# Patient Record
Sex: MALE | Race: WHITE
[De-identification: names, ages, dates, MRNs, and addresses within clinical notes are randomized per-mention and may not be internally consistent; named-entity substitution may affect disease eponyms.]

---

## 2020-02-24 ENCOUNTER — HOSPITAL ENCOUNTER (EMERGENCY)
Dept: HOSPITAL 72 - EMR | Age: 35
LOS: 1 days | Discharge: HOME | End: 2020-02-25
Payer: COMMERCIAL

## 2020-02-24 VITALS — HEIGHT: 76 IN | WEIGHT: 200 LBS | BODY MASS INDEX: 24.36 KG/M2

## 2020-02-24 VITALS — DIASTOLIC BLOOD PRESSURE: 69 MMHG | SYSTOLIC BLOOD PRESSURE: 139 MMHG

## 2020-02-24 DIAGNOSIS — F10.239: Primary | ICD-10-CM
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
## 2020-02-24 DIAGNOSIS — G40.89: ICD-10-CM

## 2020-02-24 LAB
ADD MANUAL DIFF: NO
ALBUMIN SERPL-MCNC: 4.3 G/DL (ref 3.4–5)
ALBUMIN/GLOB SERPL: 1.1 {RATIO} (ref 1–2.7)
ALP SERPL-CCNC: 86 U/L (ref 46–116)
ALT SERPL-CCNC: 122 U/L (ref 12–78)
ANION GAP SERPL CALC-SCNC: 21 MMOL/L (ref 5–15)
AST SERPL-CCNC: 108 U/L (ref 15–37)
BASOPHILS NFR BLD AUTO: 1.2 % (ref 0–2)
BILIRUB SERPL-MCNC: 1 MG/DL (ref 0.2–1)
BUN SERPL-MCNC: 9 MG/DL (ref 7–18)
CALCIUM SERPL-MCNC: 9.7 MG/DL (ref 8.5–10.1)
CHLORIDE SERPL-SCNC: 99 MMOL/L (ref 98–107)
CO2 SERPL-SCNC: 24 MMOL/L (ref 21–32)
CREAT SERPL-MCNC: 1.1 MG/DL (ref 0.55–1.3)
EOSINOPHIL NFR BLD AUTO: 0.3 % (ref 0–3)
ERYTHROCYTE [DISTWIDTH] IN BLOOD BY AUTOMATED COUNT: 11.4 % (ref 11.6–14.8)
GLOBULIN SER-MCNC: 4 G/DL
HCT VFR BLD CALC: 49.3 % (ref 42–52)
HGB BLD-MCNC: 16.9 G/DL (ref 14.2–18)
LYMPHOCYTES NFR BLD AUTO: 29.8 % (ref 20–45)
MCV RBC AUTO: 105 FL (ref 80–99)
MONOCYTES NFR BLD AUTO: 12.6 % (ref 1–10)
NEUTROPHILS NFR BLD AUTO: 56.2 % (ref 45–75)
PLATELET # BLD: 210 K/UL (ref 150–450)
POTASSIUM SERPL-SCNC: 3.4 MMOL/L (ref 3.5–5.1)
RBC # BLD AUTO: 4.71 M/UL (ref 4.7–6.1)
SODIUM SERPL-SCNC: 143 MMOL/L (ref 136–145)
WBC # BLD AUTO: 12.6 K/UL (ref 4.8–10.8)

## 2020-02-24 PROCEDURE — 93005 ELECTROCARDIOGRAM TRACING: CPT

## 2020-02-24 PROCEDURE — 96375 TX/PRO/DX INJ NEW DRUG ADDON: CPT

## 2020-02-24 PROCEDURE — 85025 COMPLETE CBC W/AUTO DIFF WBC: CPT

## 2020-02-24 PROCEDURE — 82962 GLUCOSE BLOOD TEST: CPT

## 2020-02-24 PROCEDURE — 36415 COLL VENOUS BLD VENIPUNCTURE: CPT

## 2020-02-24 PROCEDURE — 96365 THER/PROPH/DIAG IV INF INIT: CPT

## 2020-02-24 PROCEDURE — 80307 DRUG TEST PRSMV CHEM ANLYZR: CPT

## 2020-02-24 PROCEDURE — 80053 COMPREHEN METABOLIC PANEL: CPT

## 2020-02-24 PROCEDURE — 74177 CT ABD & PELVIS W/CONTRAST: CPT

## 2020-02-24 PROCEDURE — 99284 EMERGENCY DEPT VISIT MOD MDM: CPT

## 2020-02-24 PROCEDURE — 83690 ASSAY OF LIPASE: CPT

## 2020-02-24 PROCEDURE — 81001 URINALYSIS AUTO W/SCOPE: CPT

## 2020-02-24 PROCEDURE — 96367 TX/PROPH/DG ADDL SEQ IV INF: CPT

## 2020-02-24 NOTE — EMERGENCY ROOM REPORT
History of Present Illness


General


Chief Complaint:  Seizure


Source:  Family Member, EMS





Present Illness


HPI


Is a 34-year-old male presents after witnessed seizure.  Patient had recently 

stopped drinking alcohol.  Previously reports drinking alcohol daily for 

prolonged period of time.  Patient states he also takes Xanax as well as Ativan 

intermittently.  Patient had had some episodes of increased muscle spasming as 

well as nausea and vomiting.  Had not been having any hematemesis or bloody 

stools recently.  Reports having increased epigastric pain.  Denies any recent 

trauma.


Allergies:  


Coded Allergies:  


     No Known Allergies (Unverified , 2/24/20)





Patient History


Past Medical History:  see triage record


Reviewed Nursing Documentation:  PMH: Agreed; PSxH: Agreed





Nursing Documentation-PM


Past Medical History:  No History, Except For





Review of Systems


All Other Systems:  negative except mentioned in HPI





Physical Exam





Vital Signs








  Date Time  Temp Pulse Resp B/P (MAP) Pulse Ox O2 Delivery O2 Flow Rate FiO2


 


2/24/20 22:44  104 18 139/69 (92) 98   








Sp02 EP Interpretation:  reviewed, normal


General Appearance:  normal inspection, well appearing, no apparent distress, 

alert, GCS 15


Head:  atraumatic


ENT:  normal ENT inspection, hearing grossly normal, normal voice


Neck:  normal inspection, full range of motion, supple, no bony tend


Respiratory:  normal inspection, lungs clear, normal breath sounds, no 

respiratory distress, no retraction, no wheezing


Cardiovascular #1:  regular rate, rhythm, no edema


Gastrointestinal:  normal inspection, normal bowel sounds, non tender, soft, no 

guarding, no hernia


Genitourinary:  no CVA tenderness


Musculoskeletal:  normal inspection, back normal, normal range of motion


Neurologic:  alert, motor strength/tone normal, CNs III-XII nml as tested, 

oriented x3, responsive, speech normal, normal inspection


Psychiatric:  normal inspection, judgement/insight normal, mood/affect normal





Medical Decision Making


Diagnostic Impression:  


 Primary Impression:  


 Alcohol abuse


 Additional Impression:  


 Alcohol withdrawal seizure


ER Course


 Patient presented for seizure.  Differential diagnosis included alcohol 

withdrawal, hyponatremia, cysticercosis, electrolyte abnormality, mass lesion, 

or cranial hemorrhage.  Imaging studies were ordered to patient's complaints of 

low back pain after the seizure.  CT imaging read by radiology showed no 

evidence of acute abdominal process with some fatty liver.  Patient was given 

medications for alcohol withdrawal including Ativan as well as GI cocktail.  He 

was noted to have improvement was able to ambulate without assistance.  At the 

time of discharge patient was awake alert and oriented.  He did not appear to 

be tremulous and was not tachycardic.  Appears to be stable for discharge.  The 

patient is advised to follow up with  primary care doctor in 1-2 days.  Patient 

is advised to return if any worsening condition or if any changes in status 

that are concerning.





This report is dictated with Dragon transcription software which may 

occasionally lead to discrepancies related to use of this software.





Labs








Test


  2/24/20


22:55 2/25/20


02:00


 


White Blood Count


  12.6 K/UL


(4.8-10.8) 


 


 


Red Blood Count


  4.71 M/UL


(4.70-6.10) 


 


 


Hemoglobin


  16.9 G/DL


(14.2-18.0) 


 


 


Hematocrit


  49.3 %


(42.0-52.0) 


 


 


Mean Corpuscular Volume 105 FL (80-99)  


 


Mean Corpuscular Hemoglobin


  35.8 PG


(27.0-31.0) 


 


 


Mean Corpuscular Hemoglobin


Concent 34.3 G/DL


(32.0-36.0) 


 


 


Red Cell Distribution Width


  11.4 %


(11.6-14.8) 


 


 


Platelet Count


  210 K/UL


(150-450) 


 


 


Mean Platelet Volume


  8.0 FL


(6.5-10.1) 


 


 


Neutrophils (%) (Auto)


  56.2 %


(45.0-75.0) 


 


 


Lymphocytes (%) (Auto)


  29.8 %


(20.0-45.0) 


 


 


Monocytes (%) (Auto)


  12.6 %


(1.0-10.0) 


 


 


Eosinophils (%) (Auto)


  0.3 %


(0.0-3.0) 


 


 


Basophils (%) (Auto)


  1.2 %


(0.0-2.0) 


 


 


Sodium Level


  143 MMOL/L


(136-145) 


 


 


Potassium Level


  3.4 MMOL/L


(3.5-5.1) 


 


 


Chloride Level


  99 MMOL/L


() 


 


 


Carbon Dioxide Level


  24 MMOL/L


(21-32) 


 


 


Anion Gap


  21 mmol/L


(5-15) 


 


 


Blood Urea Nitrogen 9 mg/dL (7-18)  


 


Creatinine


  1.1 MG/DL


(0.55-1.30) 


 


 


Estimat Glomerular Filtration


Rate > 60 mL/min


(>60) 


 


 


Glucose Level


  124 MG/DL


() 


 


 


Calcium Level


  9.7 MG/DL


(8.5-10.1) 


 


 


Total Bilirubin


  1.0 MG/DL


(0.2-1.0) 


 


 


Aspartate Amino Transf


(AST/SGOT) 108 U/L


(15-37) 


 


 


Alanine Aminotransferase


(ALT/SGPT) 122 U/L


(12-78) 


 


 


Alkaline Phosphatase


  86 U/L


() 


 


 


Total Protein


  8.3 G/DL


(6.4-8.2) 


 


 


Albumin


  4.3 G/DL


(3.4-5.0) 


 


 


Globulin 4.0 g/dL  


 


Albumin/Globulin Ratio 1.1 (1.0-2.7)  


 


Lipase


  84 U/L


() 


 


 


Urine Color  Pale yellow 


 


Urine Appearance  Clear 


 


Urine pH  8 (4.5-8.0) 


 


Urine Specific Gravity


  


  1.015


(1.005-1.035)


 


Urine Protein


  


  Negative


(NEGATIVE)


 


Urine Glucose (UA)


  


  Negative


(NEGATIVE)


 


Urine Ketones


  


  Negative


(NEGATIVE)


 


Urine Blood


  


  Negative


(NEGATIVE)


 


Urine Nitrite


  


  Negative


(NEGATIVE)


 


Urine Bilirubin


  


  Negative


(NEGATIVE)


 


Urine Urobilinogen


  


  Normal MG/DL


(0.0-1.0)


 


Urine Leukocyte Esterase


  


  Negative


(NEGATIVE)


 


Urine RBC


  


  0-2 /HPF (0 -


0)


 


Urine WBC  0 /HPF (0 - 0) 


 


Urine Squamous Epithelial


Cells 


  None /LPF


(NONE/OCC)


 


Urine Bacteria


  


  None /HPF


(NONE)


 


Urine Opiates Screen


  


  Negative


(NEGATIVE)


 


Urine Barbiturates Screen


  


  Negative


(NEGATIVE)


 


Phencyclidine (PCP) Screen


  


  Negative


(NEGATIVE)


 


Urine Amphetamines Screen


  


  Negative


(NEGATIVE)


 


Urine Benzodiazepines Screen


  


  Positive


(NEGATIVE)


 


Urine Cocaine Screen


  


  Negative


(NEGATIVE)


 


Urine Marijuana (THC) Screen


  


  Negative


(NEGATIVE)











Last Vital Signs








  Date Time  Temp Pulse Resp B/P (MAP) Pulse Ox O2 Delivery O2 Flow Rate FiO2


 


2/24/20 22:44  104 18 139/69 (92) 98   








Status:  improved


Disposition:  HOME, SELF-CARE


Condition:  Stable


Scripts


Ondansetron Odt* (ZOFRAN ODT*) 4 Mg Tab.rapdis


4 MG BC EVERY 8 HOURS PRN for Nausea & Vomiting, #10 TAB 0 Refills


   Prov: Felix Brooks MD         2/25/20 


Omeprazole (OMEPRAZOLE) 20 Mg Capsule.dr


20 MG ORAL DAILY, #30 CAP


   Prov: Felix Brooks MD         2/25/20 


Lorazepam* (ATIVAN*) 1 Mg Tablet


1 MG ORAL THREE TIMES A DAY, #14 TAB


   Prov: Felix Brooks MD         2/25/20


Referrals:  


NON PHYSICIAN (PCP)











Felix Brooks MD Feb 24, 2020 23:44

## 2020-02-24 NOTE — NUR
ED Nurse Note:

Pt brought in by ambulance from home s/p seizure at home. Per girlfriend, he 
was standing and fell to the ground, hitting head and shaking x1min. PT is 
confused, Pt is A&ox2. VSS, Pt placed on cardiac monitor

## 2020-02-25 VITALS — SYSTOLIC BLOOD PRESSURE: 128 MMHG | DIASTOLIC BLOOD PRESSURE: 67 MMHG

## 2020-02-25 VITALS — DIASTOLIC BLOOD PRESSURE: 65 MMHG | SYSTOLIC BLOOD PRESSURE: 132 MMHG

## 2020-02-25 VITALS — DIASTOLIC BLOOD PRESSURE: 67 MMHG | SYSTOLIC BLOOD PRESSURE: 128 MMHG

## 2020-02-25 LAB
APPEARANCE UR: CLEAR
APTT PPP: (no result) S
GLUCOSE UR STRIP-MCNC: NEGATIVE MG/DL
KETONES UR QL STRIP: NEGATIVE
LEUKOCYTE ESTERASE UR QL STRIP: NEGATIVE
NITRITE UR QL STRIP: NEGATIVE
PH UR STRIP: 8 [PH] (ref 4.5–8)
PROT UR QL STRIP: NEGATIVE
SP GR UR STRIP: 1.01 (ref 1–1.03)
UROBILINOGEN UR-MCNC: NORMAL MG/DL (ref 0–1)

## 2020-02-25 NOTE — NUR
ER DISCHARGE NOTE:

Patient is cleared to be discharged per ERMD, pt is aox4, on room air, with 
stable vital signs. pt was given dc and prescription instructions, pt was able 
to verbalize understanding, pt id band and iv site removed without 
complications. pt is able to ambulate with steady gait. pt took all belongings. 
pt given crutches to help ambulate, pt reported it is easier walking with it. 
spouse present

## 2020-02-25 NOTE — DIAGNOSTIC IMAGING REPORT
INDICATION: Abdominal pain

 

TECHNIQUE: Continuous helical transaxial imaging of the abdomen and pelvis was

obtained from the lung bases to the pubic symphysis during intravenous contrast

administration. Coronal 2-D reformats were also obtained. Study obtained in a Siemens

sensation 64 slice CT.  Automatic Exposure Control was utilized.

 

Total Dose length Product (DLP):  450.6 mGycm

 

CT Dose Index Volume (CTDIvol):   70.4 mGy

 

COMPARISON: None

 

FINDINGS: 

 

Lungs: There is posterior basal atelectasis demonstrated..

 

Liver: Liver is diffusely hypodense consistent with fatty infiltration

 

Gallbladder/biliary system: No gallstones are identified. There is no evidence of

intrahepatic or extrahepatic biliary ductal dilatation.

 

Spleen: Unremarkable

 

Pancreas: Unremarkable

 

Kidneys/Bladder: No hydronephrosis identified. Both kidneys enhance symmetrically.

The urinary bladder is unremarkable..

 

Adrenal glands: Unremarkable

 

Aorta/IVC: Unremarkable

 

Bowel: There is no evidence of bowel obstruction.

 

Peritoneum: There is no free fluid.

 

Bones: Unremarkable

 

IMPRESSION:

 

Fatty liver

 

Basal atelectasis.

 

[facility] has communicated the preliminary results to the Emergency

Department.  Their findings are largely concordant with this report.

 

 

 

 

 

 

 

 

 

 

The CT scanner at [facility] is accredited by the American College of

Radiology and the scans are performed using dose optimization techniques as

appropriate to a performed exam including Automatic Exposure control.

## 2020-04-20 ENCOUNTER — HOSPITAL ENCOUNTER (EMERGENCY)
Dept: HOSPITAL 72 - EMR | Age: 35
Discharge: HOME | End: 2020-04-20
Payer: COMMERCIAL

## 2020-04-20 VITALS — HEIGHT: 76 IN | WEIGHT: 185 LBS | BODY MASS INDEX: 22.53 KG/M2

## 2020-04-20 VITALS — SYSTOLIC BLOOD PRESSURE: 127 MMHG | DIASTOLIC BLOOD PRESSURE: 76 MMHG

## 2020-04-20 DIAGNOSIS — J98.11: ICD-10-CM

## 2020-04-20 DIAGNOSIS — R06.02: ICD-10-CM

## 2020-04-20 DIAGNOSIS — K76.0: ICD-10-CM

## 2020-04-20 DIAGNOSIS — Z79.899: ICD-10-CM

## 2020-04-20 DIAGNOSIS — K29.70: Primary | ICD-10-CM

## 2020-04-20 DIAGNOSIS — G40.909: ICD-10-CM

## 2020-04-20 LAB
ADD MANUAL DIFF: NO
ALBUMIN SERPL-MCNC: 4 G/DL (ref 3.4–5)
ALBUMIN/GLOB SERPL: 1 {RATIO} (ref 1–2.7)
ALP SERPL-CCNC: 71 U/L (ref 46–116)
ALT SERPL-CCNC: 36 U/L (ref 12–78)
ANION GAP SERPL CALC-SCNC: 5 MMOL/L (ref 5–15)
AST SERPL-CCNC: 24 U/L (ref 15–37)
BASOPHILS NFR BLD AUTO: 1 % (ref 0–2)
BILIRUB SERPL-MCNC: 0.3 MG/DL (ref 0.2–1)
BUN SERPL-MCNC: 10 MG/DL (ref 7–18)
CALCIUM SERPL-MCNC: 9.1 MG/DL (ref 8.5–10.1)
CHLORIDE SERPL-SCNC: 106 MMOL/L (ref 98–107)
CO2 SERPL-SCNC: 27 MMOL/L (ref 21–32)
CREAT SERPL-MCNC: 1 MG/DL (ref 0.55–1.3)
EOSINOPHIL NFR BLD AUTO: 1.5 % (ref 0–3)
ERYTHROCYTE [DISTWIDTH] IN BLOOD BY AUTOMATED COUNT: 11.8 % (ref 11.6–14.8)
GLOBULIN SER-MCNC: 3.9 G/DL
HCT VFR BLD CALC: 45.3 % (ref 42–52)
HGB BLD-MCNC: 15 G/DL (ref 14.2–18)
LYMPHOCYTES NFR BLD AUTO: 33.2 % (ref 20–45)
MCV RBC AUTO: 102 FL (ref 80–99)
MONOCYTES NFR BLD AUTO: 8.9 % (ref 1–10)
NEUTROPHILS NFR BLD AUTO: 55.4 % (ref 45–75)
PLATELET # BLD: 268 K/UL (ref 150–450)
POTASSIUM SERPL-SCNC: 3.6 MMOL/L (ref 3.5–5.1)
RBC # BLD AUTO: 4.45 M/UL (ref 4.7–6.1)
SODIUM SERPL-SCNC: 138 MMOL/L (ref 136–145)
WBC # BLD AUTO: 8.6 K/UL (ref 4.8–10.8)

## 2020-04-20 PROCEDURE — 99283 EMERGENCY DEPT VISIT LOW MDM: CPT

## 2020-04-20 PROCEDURE — 80053 COMPREHEN METABOLIC PANEL: CPT

## 2020-04-20 PROCEDURE — 83690 ASSAY OF LIPASE: CPT

## 2020-04-20 PROCEDURE — 71045 X-RAY EXAM CHEST 1 VIEW: CPT

## 2020-04-20 PROCEDURE — 36415 COLL VENOUS BLD VENIPUNCTURE: CPT

## 2020-04-20 PROCEDURE — 84484 ASSAY OF TROPONIN QUANT: CPT

## 2020-04-20 PROCEDURE — 85025 COMPLETE CBC W/AUTO DIFF WBC: CPT

## 2020-04-20 PROCEDURE — 93005 ELECTROCARDIOGRAM TRACING: CPT

## 2020-04-20 NOTE — EMERGENCY ROOM REPORT
History of Present Illness


General


Chief Complaint:  Pain


Source:  Patient





Present Illness


HPI


Patient is a 34-year-old male presents after increased upper abdominal pain and 

chest discomfort.  Prior history of alcohol abuse.  He reports quitting 

drinking approximately a month and a half ago.  Reports taking alprazolam daily 

but denies taking any other medications.  Denies any bloody stools.  Not been 

vomiting.  Pain is worse with deep breath.  Patient had previous CT imaging 

which did not show any evidence of pulmonary embolism but did show some 

atelectasis.  Denies any stool abnormalities.  Had not been having any fever.  

Denies any cough.


Allergies:  


Coded Allergies:  


     No Known Allergies (Unverified , 2/24/20)





COVID-19 Screening


Contact w/high risk pt:  No


Recent Travel to affected area:  No


Experienced COVID-19 symptoms?:  No





Patient History


Past Medical History:  see triage record


Reviewed Nursing Documentation:  PMH: Agreed; PSxH: Agreed





Nursing Documentation-PM


Past Medical History:  No History, Except For


Hx Seizures:  Yes





Physical Exam





Vital Signs








  Date Time  Temp Pulse Resp B/P (MAP) Pulse Ox O2 Delivery O2 Flow Rate FiO2


 


4/20/20 15:52 98.2 96 17 127/76 (93) 98 Room Air  








Sp02 EP Interpretation:  reviewed, normal


General Appearance:  normal inspection, well appearing, no apparent distress, 

alert, GCS 15


Head:  atraumatic


ENT:  normal ENT inspection, hearing grossly normal, normal voice


Neck:  normal inspection, full range of motion, supple, no bony tend


Respiratory:  normal inspection, lungs clear, normal breath sounds, no 

respiratory distress, no retraction, no wheezing


Cardiovascular #1:  regular rate, rhythm, no edema


Gastrointestinal:  normal inspection, normal bowel sounds, non tender, soft, no 

guarding, no hernia


Genitourinary:  no CVA tenderness


Musculoskeletal:  normal inspection, back normal, normal range of motion


Neurologic:  alert, motor strength/tone normal, CNs III-XII nml as tested, 

oriented x3, responsive, speech normal, normal inspection


Psychiatric:  normal inspection, judgement/insight normal, mood/affect normal


Skin:  no rash





Medical Decision Making


Diagnostic Impression:  


 Primary Impression:  


 Gastritis


 Additional Impression:  


 Fatty liver


ER Course


Patient presented for upper abdominal pain.  Differential diagnosis include was 

not limited to myocardial infarction, gastritis, pancreatitis, peptic ulcer 

disease among others.  Because of complexity of patient's case laboratory tests 

and imaging studies were ordered.  Patient was noted to have prior history of 

alcohol abuse.  He states pain is been ongoing for prolonged period of time.  

Does not appear to have any exertional component.  Does not appear to have any 

known exposure to coronavirus.  Patient symptoms appear to be related to some 

gastric irritation.  He was given a GI cocktail.  Patient was advised to follow-

up with primary care physician for GI referral.  Patient was advised to return 

if increased shortness of breath, worsening pain or other concerns.  He was 

given prescription for omeprazole as well as dicyclomine.





Labs








Test


  4/20/20


16:25


 


White Blood Count


  8.6 K/UL


(4.8-10.8)


 


Red Blood Count


  4.45 M/UL


(4.70-6.10)


 


Hemoglobin


  15.0 G/DL


(14.2-18.0)


 


Hematocrit


  45.3 %


(42.0-52.0)


 


Mean Corpuscular Volume 102 FL (80-99) 


 


Mean Corpuscular Hemoglobin


  33.6 PG


(27.0-31.0)


 


Mean Corpuscular Hemoglobin


Concent 33.1 G/DL


(32.0-36.0)


 


Red Cell Distribution Width


  11.8 %


(11.6-14.8)


 


Platelet Count


  268 K/UL


(150-450)


 


Mean Platelet Volume


  7.4 FL


(6.5-10.1)


 


Neutrophils (%) (Auto)


  55.4 %


(45.0-75.0)


 


Lymphocytes (%) (Auto)


  33.2 %


(20.0-45.0)


 


Monocytes (%) (Auto)


  8.9 %


(1.0-10.0)


 


Eosinophils (%) (Auto)


  1.5 %


(0.0-3.0)


 


Basophils (%) (Auto)


  1.0 %


(0.0-2.0)


 


Sodium Level


  138 MMOL/L


(136-145)


 


Potassium Level


  3.6 MMOL/L


(3.5-5.1)


 


Chloride Level


  106 MMOL/L


()


 


Carbon Dioxide Level


  27 MMOL/L


(21-32)


 


Anion Gap


  5 mmol/L


(5-15)


 


Blood Urea Nitrogen


  10 mg/dL


(7-18)


 


Creatinine


  1.0 MG/DL


(0.55-1.30)


 


Estimat Glomerular Filtration


Rate > 60 mL/min


(>60)


 


Glucose Level


  96 MG/DL


()


 


Calcium Level


  9.1 MG/DL


(8.5-10.1)


 


Total Bilirubin


  0.3 MG/DL


(0.2-1.0)


 


Aspartate Amino Transf


(AST/SGOT) 24 U/L (15-37) 


 


 


Alanine Aminotransferase


(ALT/SGPT) 36 U/L (12-78) 


 


 


Alkaline Phosphatase


  71 U/L


()


 


Troponin I


  0.000 ng/mL


(0.000-0.056)


 


Total Protein


  7.9 G/DL


(6.4-8.2)


 


Albumin


  4.0 G/DL


(3.4-5.0)


 


Globulin 3.9 g/dL 


 


Albumin/Globulin Ratio 1.0 (1.0-2.7) 


 


Lipase


  88 U/L


()








EKG Diagnostic Results


Rate:  normal


Rhythm:  NSR


ST Segments:  no acute changes





Last Vital Signs








  Date Time  Temp Pulse Resp B/P (MAP) Pulse Ox O2 Delivery O2 Flow Rate FiO2


 


4/20/20 15:52 98.2 96 17 127/76 (93) 98 Room Air  








Status:  improved


Disposition:  HOME, SELF-CARE


Condition:  Stable


Scripts


Dicyclomine Hcl* (DICYCLOMINE HCL*) 10 Mg Capsule


10 MG ORAL QID, #20 CAP


   Prov: Felix Brooks MD         4/20/20 


Omeprazole Magnesium (PRILOSEC OTC) 20 Mg Tablet.


20 MG ORAL DAILY, #30 TAB


   Prov: Felix Brooks MD         4/20/20











Felix Brooks MD Apr 20, 2020 16:12

## 2020-04-20 NOTE — DIAGNOSTIC IMAGING REPORT
Indication: Shortness of breath

 

Technique: One view of the chest

 

Comparison: none

 

Findings: Lungs and pleural spaces are clear. Heart size is normal.

 

Impression: No acute process

## 2020-10-05 ENCOUNTER — HOSPITAL ENCOUNTER (EMERGENCY)
Dept: HOSPITAL 72 - EMR | Age: 35
Discharge: HOME | End: 2020-10-05
Payer: COMMERCIAL

## 2020-10-05 VITALS — WEIGHT: 185 LBS | HEIGHT: 76 IN | BODY MASS INDEX: 22.53 KG/M2

## 2020-10-05 VITALS — SYSTOLIC BLOOD PRESSURE: 133 MMHG | DIASTOLIC BLOOD PRESSURE: 85 MMHG

## 2020-10-05 VITALS — SYSTOLIC BLOOD PRESSURE: 137 MMHG | DIASTOLIC BLOOD PRESSURE: 91 MMHG

## 2020-10-05 DIAGNOSIS — G40.909: ICD-10-CM

## 2020-10-05 DIAGNOSIS — F10.10: Primary | ICD-10-CM

## 2020-10-05 PROCEDURE — 99282 EMERGENCY DEPT VISIT SF MDM: CPT

## 2020-10-05 NOTE — EMERGENCY ROOM REPORT
History of Present Illness


General


Chief Complaint:  Seizure


Source:  Patient





Present Illness


HPI


34-year-old male history of alcohol withdrawal seizures in the past presents 

with concerns of seizure trying to slowly wean himself off alcohol patient 

states that he wants to try cutting his alcohol use, patient reports that he 

last drank mimosas this morning, denies feeling tremulous shaky or having any 

chest pain no nausea no vomiting, patient wants to try outpatient weaning.  He 

feels concerned, concerned started this morning because he wants to try weaning 

off, severity is mild, aggravated when he tries to wean off alcohol alleviated 

when he is on alcohol.  Patient presents for evaluation and treatment


Allergies:  


Coded Allergies:  


     No Known Allergies (Unverified , 2/24/20)





COVID-19 Screening


Contact w/high risk pt:  No


Recent Travel to affected area:  No


Experienced COVID-19 symptoms?:  No


COVID-19 Testing performed PTA:  No


COVID-19 Screening:  Negative COVID-19


COVID-19 Testing Source:  2wks ago





Patient History


Past Medical History:  see triage record


Social History:  Reports: alcohol use


Reviewed Nursing Documentation:  PMH: Agreed; PSxH: Agreed





Nursing Documentation-PMH


Hx Seizures:  Yes





Review of Systems


All Other Systems:  negative except mentioned in HPI





Physical Exam





Vital Signs








  Date Time  Temp Pulse Resp B/P (MAP) Pulse Ox O2 Delivery O2 Flow Rate FiO2


 


10/5/20 14:01 98.2 105 18 145/96 (112) 98 Room Air  








General Appearance:  well appearing, no apparent distress


Head:  normocephalic, atraumatic


ENT:  hearing grossly normal, normal voice


Neck:  full range of motion, supple


Respiratory:  no respiratory distress, speaking full sentences


Neurologic:  alert, normal gait, other - No tremors no tremulousness


Psychiatric:  mood/affect normal


Skin:  no rash





Medical Decision Making


Diagnostic Impression:  


   Primary Impression:  


   Alcohol abuse


ER Course


34-year-old male presents with requesting alcohol withdrawal treatment.


Joint decision was made with patient to wean off alcohol utilizing a Librium 

taper


Patient given a Librium taper, patient also provided with multiple resources for

drug rehab.  Strict return precautions for seizures were discussed follow-up 

with PCP


Ady report was ran





Last Vital Signs








  Date Time  Temp Pulse Resp B/P (MAP) Pulse Ox O2 Delivery O2 Flow Rate FiO2


 


10/5/20 14:01 98.2 105 18 145/96 (112) 98 Room Air  








Disposition:  HOME, SELF-CARE


Condition:  Stable


Scripts


Chlordiazepoxide (Chlordiazepoxide HCl) 25 Mg Capsule


25 MG ORAL THREE TIMES A DAY, #15 CAP 0 Refills


   Prov: Maksim Pathak MD         10/5/20


Referrals:  


[facility]





[facility]





[facility]


Patient Instructions:  Alcohol Use Disorder, Alcohol Withdrawal, Easy-to-Read





Additional Instructions:  


The patient was provided with discharge instructions, notified to follow-up with

a primary care doctor and or specialist in the next 24-48 hours, and to return 

to the ED if they have worsening of their symptoms. 





Please note that this report is being documented using DRAGON technology.


This can lead to erroneous entry secondary to incorrect interpretation by the 

dictating instrument. 





LIBRIUM TAPER 





Day 1: 50mg EVERY 6 HOURS


Day 2: 25mg EVERY 6 HOURS


Day 3: 25mg EVERY 12 HOURS


Day 4: 25mg AT NIGHT











Maksim Pathak MD           Oct 5, 2020 14:26

## 2020-10-12 ENCOUNTER — HOSPITAL ENCOUNTER (EMERGENCY)
Dept: HOSPITAL 72 - EMR | Age: 35
Discharge: HOME | End: 2020-10-12
Payer: COMMERCIAL

## 2020-10-12 VITALS — HEIGHT: 75 IN | WEIGHT: 185 LBS | BODY MASS INDEX: 23 KG/M2

## 2020-10-12 VITALS — SYSTOLIC BLOOD PRESSURE: 144 MMHG | DIASTOLIC BLOOD PRESSURE: 95 MMHG

## 2020-10-12 VITALS — DIASTOLIC BLOOD PRESSURE: 95 MMHG | SYSTOLIC BLOOD PRESSURE: 144 MMHG

## 2020-10-12 DIAGNOSIS — F10.239: Primary | ICD-10-CM

## 2020-10-12 DIAGNOSIS — G40.909: ICD-10-CM

## 2020-10-12 PROCEDURE — 99282 EMERGENCY DEPT VISIT SF MDM: CPT

## 2020-10-12 NOTE — EMERGENCY ROOM REPORT
History of Present Illness


General


Chief Complaint:  Medication Refill


Source:  Patient





Present Illness


HPI


34-year-old male presents to ED for evaluation.  Patient states that he is 

recently stopped drinking alcohol and feels like he is going through 

withdrawals.  Feels shaky.  States he has had seizures previously.  States he 

was prescribed Librium the last time he was here but states it did not work.  

States he has high tolerance to benzos as he normally used to take 2 mg of Xanax

daily.  Denies drug use.  Denies any alcohol use at this time.  Denies SI or HI.

 Denies hearing voices.  No other aggravating relieving factors.  Denies any 

other associated symptoms


Allergies:  


Coded Allergies:  


     No Known Allergies (Unverified , 2/24/20)





COVID-19 Screening


Contact w/high risk pt:  No


Recent Travel to affected area:  No


Experienced COVID-19 symptoms?:  No


COVID-19 Testing performed PTA:  No





Patient History


Past Medical History:  none


Past Surgical History:  none


Pertinent Family History:  none


Social History:  Reports: alcohol use; Denies: smoking, drug use


Immunizations:  UTD


Reviewed Nursing Documentation:  PMH: Agreed; PSxH: Agreed





Nursing Documentation-PMH


Past Medical History:  No History, Except For


Hx Seizures:  Yes





Review of Systems


All Other Systems:  negative except mentioned in HPI





Physical Exam





Vital Signs








  Date Time  Temp Pulse Resp B/P (MAP) Pulse Ox O2 Delivery O2 Flow Rate FiO2


 


10/12/20 14:26 98.1 102 17 144/95 (111) 99 Room Air  








Sp02 EP Interpretation:  reviewed, normal


General Appearance:  no apparent distress, alert, GCS 15, non-toxic


Head:  normocephalic, atraumatic


Eyes:  bilateral eye normal inspection, bilateral eye PERRL


ENT:  hearing grossly normal, normal pharynx, no angioedema, normal voice


Neck:  full range of motion, supple/symm/no masses


Respiratory:  chest non-tender, lungs clear, normal breath sounds, speaking full

sentences


Cardiovascular #1:  regular rate, rhythm, no edema


Cardiovascular #2:  2+ carotid (R), 2+ carotid (L), 2+ radial (R), 2+ radial 

(L), 2+ dorsalis pedis (R), 2+ dorsalis pedis (L)


Gastrointestinal:  normal bowel sounds, non tender, soft, non-distended, no 

guarding, no rebound


Rectal:  deferred


Genitourinary:  normal inspection, no CVA tenderness


Musculoskeletal:  back normal, normal range of motion, gait/station normal, non-

tender


Neurologic:  alert, motor strength/tone normal, oriented x3, sensory intact, 

responsive, speech normal


Psychiatric:  judgement/insight normal, memory normal, no suicidal/homicidal 

ideation, no delusions, anxious


Reflexes:  3+ bicep (R), 3+ bicep (L), 3+ tricep (R), 3+ tricep (L), 3+ knee 

(R), 3+ knee (L)


Skin:  no rash


Lymphatic:  no adenopathy





Medical Decision Making


Diagnostic Impression:  


   Primary Impression:  


   Alcohol withdrawal


   Qualified Codes:  F10.239 - Alcohol dependence with withdrawal, unspecified


ER Course


Hospital Course 





34-year-old male presents ED states that he is going through alcohol withdrawal.





Differential diagnoses include: Alcohol intoxication, drug abuse, opioid 

withdrawal, alcohol withdrawal, dehydration, drug seeking behavior





Clinical course


Patient placed chair.  Stable vitals.  Patient alert oriented x3.  Not 

tremulous.  No signs of DTs.





Was previously prescribed Librium which is a short acting medication.  Will 

attempt a trial with longer acting benzo like Ativan.  Will provide referrals to

PMD and detox.  Safe for discharge close outpatient follow-up.  Patient agrees 

to plan





i.  I feel this is a highly complex case requiring extensive working including 

EKG/Rhythm strip, Xray/CT/US, Blood/urine lab work, repeat exams while in ED, 

and administration of strong opiates/narcotics for pain control, admission to 

hospital or close patient follow up.  





Diagnosis - alcohol withdrawal





Stable and discharged to home with prescription for ativan.  Followup with PMD. 

Return to ED if symptoms recur or worsen





Last Vital Signs








  Date Time  Temp Pulse Resp B/P (MAP) Pulse Ox O2 Delivery O2 Flow Rate FiO2


 


10/12/20 14:34 98.1  17 144/95 99 Room Air  


 


10/12/20 14:26  102      








Status:  improved


Disposition:  HOME, SELF-CARE


Condition:  Stable


Scripts


Lorazepam* (ATIVAN*) 1 Mg Tablet


1 MG ORAL THREE TIMES A DAY, #15 TAB


   Prov: Bert Vick MD         10/12/20


Referrals:  


[facility]











[facility]


Patient Instructions:  Alcohol Intoxication, Easy-to-Read











Bert Vick MD            Oct 12, 2020 17:34